# Patient Record
Sex: FEMALE | Race: WHITE
[De-identification: names, ages, dates, MRNs, and addresses within clinical notes are randomized per-mention and may not be internally consistent; named-entity substitution may affect disease eponyms.]

---

## 2017-04-05 ENCOUNTER — HOSPITAL ENCOUNTER (OUTPATIENT)
Dept: HOSPITAL 45 - C.MAMM | Age: 57
Discharge: HOME | End: 2017-04-05
Attending: OBSTETRICS & GYNECOLOGY
Payer: COMMERCIAL

## 2017-04-05 DIAGNOSIS — R92.8: ICD-10-CM

## 2017-04-05 DIAGNOSIS — Z09: Primary | ICD-10-CM

## 2017-04-05 NOTE — MAMMOGRAPHY REPORT
BILATERAL DIGITAL DIAGNOSTIC MAMMOGRAM TOMOSYNTHESIS WITH CAD AND TARGETED LEFT ULTRASOUND: 4/5/2017

CLINICAL HISTORY: 56-year-old woman presents for follow-up in the left breast for benign-appearing a
symmetries in the upper outer middle and anterior breast with probable cystic correlates on ultrasou
nd.  





TECHNIQUE: Bilateral breast tomosynthesis in addition to standard 2D mammography was performed. Curr
ent study was also evaluated with a Computer Aided Detection (CAD) system.  



COMPARISON: Comparison is made to exams dated:  11/2/2016 ultrasound, 11/2/2016 mammogram, 3/24/2016
 mammogram, 3/9/2015 mammogram, 1/6/2014 mammogram, and 12/24/2012 mammogram - [facility].   



BREAST COMPOSITION:  There are scattered areas of fibroglandular density in both breasts.  



FINDINGS:  Asymmetries versus masses in the upper outer middle and anterior left breast are less pro
minent comparing to the prior mammogram performed 11/02/2016.  No obvious new mass, architectural di
stortion or suspicious microcalcifications are identified bilaterally.



Repeat targeted ultrasound was performed in the 12:00 and 1:00 axes of the left breast to reevaluate
 the probable cystic masses seen on prior exam.  In the 12:00 left breast, 2 cm from the nipple, the
re is a rounded to oval nearly anechoic cystic appearing mass measuring 3.5 x 3.1 x 3.8 mm, previous
 measurements were 5.6 x 3.2 x 3.1 mm.  This has slightly decreased in size.  A lobulated nearly ane
choic cystic appearing mass is seen in the 1:00 left breast, 2 cm from the nipple, measuring 3.3 x 2
.4 x 1.9 mm.  This previously measured 2.9 x 2.0 x 2.3 mm, and also appears slightly decreased in si
ze.  The interval decrease confirms benignity of both masses and no further close follow-up is neede
d at this time.





IMPRESSION:  ACR BI-RADS CATEGORY 2: BENIGN, TARGETED ULTRASOUND ACR BI-RADS CATEGORY 2: BENIGN 

Decreased prominence of asymmetries in the upper outer middle and anterior left breast with otherwis
e stable mammographic appearance bilaterally.  2 cystic-appearing masses in the left 12:00 and 1:00 
breast have decreased in size on ultrasound, confirming benignity.  There is no mammographic or targ
eted sonographic evidence of malignancy.  A 1 year screening mammogram is recommended.  The patient 
has been verbally notified of the results.  





Approximately 10% of breast cancers are not detected with mammography. A negative mammographic repor
t should not delay biopsy if a clinically suggestive mass is present.



Kristie Kline M.D.          

ay/:4/5/2017 09:11:13  



Imaging Technologist: Susan Carranza, [facility]

letter sent: Normal 1/2  

BI-RADS Code: ACR BI-RADS Category 2: Benign  Ultrasound BI-RADS: ACR BI-RADS Category 2: Benign

## 2018-05-17 ENCOUNTER — HOSPITAL ENCOUNTER (OUTPATIENT)
Dept: HOSPITAL 45 - C.MAMM | Age: 58
Discharge: HOME | End: 2018-05-17
Attending: OBSTETRICS & GYNECOLOGY
Payer: COMMERCIAL

## 2018-05-17 DIAGNOSIS — Z12.31: Primary | ICD-10-CM

## 2018-05-18 NOTE — MAMMOGRAPHY REPORT
BILATERAL DIGITAL SCREENING MAMMOGRAM TOMOSYNTHESIS WITH CAD: 5/17/2018

CLINICAL HISTORY: Routine screening.  Patient has no complaints.  





TECHNIQUE:  Breast tomosynthesis in addition to standard 2D mammography was performed. Current study 
was also evaluated with a Computer Aided Detection (CAD) system.  



COMPARISON: Comparison is made to exams dated:  4/5/2017 mammogram, 3/24/2016 mammogram, 3/9/2015 [facility]
mogram, 1/6/2014 mammogram, 6/15/2012 mammogram, and 2/2/2011 mammogram - [facility]   



BREAST COMPOSITION:  There are scattered areas of fibroglandular density in both breasts.  



FINDINGS:  No suspicious masses, calcifications, or areas of architectural distortion are noted in ei
ther breast. There has been no significant interval change compared to prior exams.  





IMPRESSION:  ACR BI-RADS CATEGORY 1: NEGATIVE

There is no mammographic evidence of malignancy. A 1 year screening mammogram is recommended.  The pa
tient will receive written notification of the results.  





Approximately 10% of breast cancers are not detected with mammography. A negative mammographic report
 should not delay biopsy if a clinically suggestive mass is present.



Manisha Jasso M.D.          

ah/:5/17/2018 16:04:42  



Imaging Technologist: Valentina GARCIA(EDGARDO)(M), [facility]

letter sent: Normal 1/2  

BI-RADS Code: ACR BI-RADS Category 1: Negative